# Patient Record
Sex: FEMALE | Race: WHITE | NOT HISPANIC OR LATINO | Employment: FULL TIME | URBAN - METROPOLITAN AREA
[De-identification: names, ages, dates, MRNs, and addresses within clinical notes are randomized per-mention and may not be internally consistent; named-entity substitution may affect disease eponyms.]

---
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
## 2023-02-16 ENCOUNTER — PATIENT MESSAGE (OUTPATIENT)
Dept: NEUROLOGY | Facility: CLINIC | Age: 18
End: 2023-02-16
Payer: COMMERCIAL

## 2023-02-16 RX ORDER — PREDNISONE 20 MG/1
TABLET ORAL
Qty: 12 TABLET | Refills: 0 | Status: SHIPPED | OUTPATIENT
Start: 2023-02-16 | End: 2023-03-01

## 2023-02-16 NOTE — TELEPHONE ENCOUNTER
"Spoke to Mom:  Having more HA since last Friday  No recent illness  Has been saying "my head hurts all over"  NO GIB, no recent use of steroids  Mom mentions she had a near syncopal episode yesterday at school, states d/t pain  Up until a week ago her medication regimen has been working.    I wrote a course of deltasone (on full stomach, breakfast time only), 3 pills for 2 days, 2 pills for 2 days, 1 pill for 2 days then off. I discussed if she can get that into her in the next few hours, that would be ideal (as it may "wire her" and make it difficult to sleep).     If any fevers, feeling faint, would suggest she get in to PCP/UC/ER to r/o sinister process.     If steroids work, but short lived, we can adjust her gabapentin.     All questions answered, mom appreciative of phone call.    TYLER   "

## 2023-02-22 ENCOUNTER — PATIENT MESSAGE (OUTPATIENT)
Dept: NEUROLOGY | Facility: CLINIC | Age: 18
End: 2023-02-22
Payer: COMMERCIAL

## 2023-02-24 ENCOUNTER — TELEPHONE (OUTPATIENT)
Dept: NEUROLOGY | Facility: CLINIC | Age: 18
End: 2023-02-24
Payer: COMMERCIAL

## 2023-02-24 NOTE — TELEPHONE ENCOUNTER
----- Message from Cirilo House PA-C sent at 2/22/2023  3:34 PM CST -----  Regarding: Follow up appt  Can we get her in to see me for a follow up visit (I sent her/mom a note via [employer]Xis I'd like to see her in office).     TYLER

## 2023-03-01 ENCOUNTER — OFFICE VISIT (OUTPATIENT)
Dept: NEUROLOGY | Facility: CLINIC | Age: 18
End: 2023-03-01
Payer: COMMERCIAL

## 2023-03-01 VITALS
DIASTOLIC BLOOD PRESSURE: 73 MMHG | BODY MASS INDEX: 27.08 KG/M2 | HEART RATE: 75 BPM | WEIGHT: 147.19 LBS | SYSTOLIC BLOOD PRESSURE: 107 MMHG | HEIGHT: 62 IN | RESPIRATION RATE: 17 BRPM

## 2023-03-01 DIAGNOSIS — G43.009 MIGRAINE WITHOUT AURA AND WITHOUT STATUS MIGRAINOSUS, NOT INTRACTABLE: Primary | ICD-10-CM

## 2023-03-01 DIAGNOSIS — F41.9 ANXIETY: ICD-10-CM

## 2023-03-01 DIAGNOSIS — G44.209 TENSION HEADACHE: ICD-10-CM

## 2023-03-01 PROCEDURE — 99999 PR PBB SHADOW E&M-EST. PATIENT-LVL IV: CPT | Mod: PBBFAC,,, | Performed by: PHYSICIAN ASSISTANT

## 2023-03-01 PROCEDURE — 99213 PR OFFICE/OUTPT VISIT, EST, LEVL III, 20-29 MIN: ICD-10-PCS | Mod: S$GLB,,, | Performed by: PHYSICIAN ASSISTANT

## 2023-03-01 PROCEDURE — 3078F PR MOST RECENT DIASTOLIC BLOOD PRESSURE < 80 MM HG: ICD-10-PCS | Mod: CPTII,S$GLB,, | Performed by: PHYSICIAN ASSISTANT

## 2023-03-01 PROCEDURE — 1160F RVW MEDS BY RX/DR IN RCRD: CPT | Mod: CPTII,S$GLB,, | Performed by: PHYSICIAN ASSISTANT

## 2023-03-01 PROCEDURE — 99213 OFFICE O/P EST LOW 20 MIN: CPT | Mod: S$GLB,,, | Performed by: PHYSICIAN ASSISTANT

## 2023-03-01 PROCEDURE — 3074F SYST BP LT 130 MM HG: CPT | Mod: CPTII,S$GLB,, | Performed by: PHYSICIAN ASSISTANT

## 2023-03-01 PROCEDURE — 1159F PR MEDICATION LIST DOCUMENTED IN MEDICAL RECORD: ICD-10-PCS | Mod: CPTII,S$GLB,, | Performed by: PHYSICIAN ASSISTANT

## 2023-03-01 PROCEDURE — 1160F PR REVIEW ALL MEDS BY PRESCRIBER/CLIN PHARMACIST DOCUMENTED: ICD-10-PCS | Mod: CPTII,S$GLB,, | Performed by: PHYSICIAN ASSISTANT

## 2023-03-01 PROCEDURE — 3008F PR BODY MASS INDEX (BMI) DOCUMENTED: ICD-10-PCS | Mod: CPTII,S$GLB,, | Performed by: PHYSICIAN ASSISTANT

## 2023-03-01 PROCEDURE — 3074F PR MOST RECENT SYSTOLIC BLOOD PRESSURE < 130 MM HG: ICD-10-PCS | Mod: CPTII,S$GLB,, | Performed by: PHYSICIAN ASSISTANT

## 2023-03-01 PROCEDURE — 3008F BODY MASS INDEX DOCD: CPT | Mod: CPTII,S$GLB,, | Performed by: PHYSICIAN ASSISTANT

## 2023-03-01 PROCEDURE — 1159F MED LIST DOCD IN RCRD: CPT | Mod: CPTII,S$GLB,, | Performed by: PHYSICIAN ASSISTANT

## 2023-03-01 PROCEDURE — 3078F DIAST BP <80 MM HG: CPT | Mod: CPTII,S$GLB,, | Performed by: PHYSICIAN ASSISTANT

## 2023-03-01 PROCEDURE — 99999 PR PBB SHADOW E&M-EST. PATIENT-LVL IV: ICD-10-PCS | Mod: PBBFAC,,, | Performed by: PHYSICIAN ASSISTANT

## 2023-03-01 RX ORDER — PROPRANOLOL HYDROCHLORIDE 10 MG/1
TABLET ORAL
Qty: 60 TABLET | Refills: 5 | Status: SHIPPED | OUTPATIENT
Start: 2023-03-01

## 2023-03-01 NOTE — PROGRESS NOTES
"  [facility]  [address] [facility] [address]  [place] [address]  Phone:[phone]  Fax: [phone]   Follow up visit     Patient Name: Dorys Rich  : 2005  MRN:  [id number]  Today: 3/1/2023   LOV: 2022  chief complaint: Headache      PCP: Tonny Busch MD.    Assessment:   Dorys Rich is a 17 y.o. right handed female with a PMHx of: anxiety/depression, ADHD, ASD with speech/cognitive changes (?), and family hx of IIH  whom presents with her sister  in follow up for HA. KELSEY chronic, have been better with gabapentin, but recently more frequent (only when she is in school, states she is having stress from being there at times). HA appearing to be more tension quality when at school.            Review:    ICD-10-CM ICD-9-CM   1. Migraine without aura and without status migrainosus, not intractable  G43.009 346.10   2. Anxiety  F41.9 300.00   3. Tension headache  G44.209 307.81               Plan:        For HA Prevention:    Gabapentin  q2h before bed at 200 mg   PRN inderal ordered at 10 mg (use for HA or "stage fright"/anxiety)., discussed adv effects/dosing, can use up to 2 x in a day. If any hypotension or bradycardia, stop.     For HA :   maxalt      To break up Headaches:  Can consider nerve blocks in future       Other:  Follow up with immunology /rheum/other specialists              All test results as well as any necessary instructions were reviewed and discussed with patient.    Review:  Orders Placed This Encounter    propranoloL (INDERAL) 10 MG tablet               Patient to return to PCP/other specialists for all other problems  Patient to continue on all medications as Rx'd  Full office note available online   RTO- 3-4 months, family will use portal to schedule follow up   The patient indicates understanding of these issues and agrees to the plan.    HPI:   Dorys Rich is a 17 y.o.right handed, female with " "a PMHx of: anxiety/depression, ADHD, ASD with speech/cognitive changes (?), and family hx of IIH  whom presents with her sister in follow up for KELSEY       At last visit: gabapentin adjusted to 200 mg QHS, and maxalt refilled. In mid Feb 2023 her mother wrote me a portal message about a string of HA. I wrote a prednisone taper to try and break the HA.      Steroids gave relief for a short time then KELSEY came right back   Gabapentin at 200 mg does make her tired but has helped ultimately  Pain is holocranial   No reports of N/V  Normally maxalt takes the HA pain venegas away   Feels stress may be triggering her HA         -seems to only occur when she is in school         -notices with stress, palpitations occur more          -"I don't get HA on weekend or when I am home."      No HA right now, "I wasn't in school today"   Confirmed she has no Hx of asthma.     From previous visits:: zanaflex stopped, gabapentin started, and maxalt available for bad HA. MRI brain and MRA head were both normal (9/21/2022).   HA still daily, but only for a few hours and less intense.  "She has felt the best on the gabapentin"   Gabapentin makes her a bit tired, goes right to bed   Still working with multiple providers for concerns about possible lupus and will be seeing dysautonomia specialist.   No other concerns, only mild HA in office   HA can be "all over"     From previous visits   Mother provides much of Hx.   At last visit: tapered off zonegran, started zanaflex (not interested in gabapentin), and has maxalt.   Saw immunologist, possible lupus (?) and rheumatologist ---"we can't figure out whats going on, she has more schedule appts coming up"   Has some polyps in her sinuses, will be getting allergy treatments in near future  Getting HA daily ---all over whole head   Pain seems to occur by mid day and seems to last until sleep  Muscle relaxant helps reduce severity but not stop the HA., mother mentions "if she misses the dose, she " "awakens and feels off and has a HA"   Sleeping well   Maxalt does work   No new weakness or vision changes  Per mom: "she just seems worse"        From last visit: started on topamax, then switched to zonegran d/t appetite suppression. Still having HA daily.   Mother provides most of Hx, patient supplements  At first states daily HA, comes/goes along frontalis, then mentions, HA occurring in back of neck, radiating forward and to the back of the eyes  Mother states "its hard to know when she has a HA and specifics as she doesn't tell me."  Seems despite switching to zonegran, still having KELSEY, Chelsey mentions "Food taste funny" and she is losing weight.   Mother also mentions she is having trouble sleeping, when historically "she was a good sleeper."  Has seen immunology, per mother, is doing "further work up."   HA 3-5/10 pain  Maxalt is helpful, if HA caught early  Chelsey states she had HA this morning and seems to have gone away 5-6 mins before the appt  No trigger identified  No new weakness/no new sensory changes  No falls.       From previous visit:     Patient provides most of her own hx, mother supplements at times     HA HPI:  Start:HA for a little while, unclear when they officially started (historically 1 HD a week, mother mentions "she may have more, but she never tells me if she has a HA until its too late, her siblings both have HA/IIH and one has abdominal migraine--all treated with elavil), however most noticeable on Good Friday with a concurrent fever and nausea (hence the hospitalization as above)   History of trauma (no), History of CNS infection (no), History of Stroke (no)  Location:forehead  However could radiate to the whole   Severity: range: 2-8/10  Duration:lasting all day long   Frequency:daily HA for the past couple weeks, historically "maybe" 1 HD a week (4HD/30 HD in a month)    Associated factors (bolded positive) WITH HA ( or migraine): Nausea, vomiting, photophobia, phonophobia, " "blurry vision (like dots/pixelation) tinnitus, scalp pain, vision loss, diplopia, scintillations, eye pain, jaw pain, weakness?    Tried:fioricet, OTC   Triggers (in bold): stress, lack of sleep, too much caffeine, too little caffeine, weather change, seasonal change, strong odours, bright lights, sunlight, food, mother states she has immune deficiency and is pending appt with immunologist in near future (recently had mono a few months ago).      Currently having a HA?:yes, 3/10  Positives in bold: Hx of Kidney Stones, asthma, GI bleed, osteoporosis, CAD/MI, CVA/TIA, DM  <---denies  Imaging on file: CT head 2022-NML, MRI and MRA head 9/21/2022 ---both normal   Therapies tried in past: (failures to be marked, if known---why did it fail?)  wellbutrin  fioriciet  catapress  zoloft  Flexeril  toradol  topamax  zonegran   zanaflex    maxalt   Zonegran  Zanaflex  Prednisone -short lived relief              From Dr. Cedillo's assessment/plan (4/30/2022):  "  * Headache  Nonfocal headache, in context of febrile illness  - do not suspect viral / aseptic meningitis given clinical exam and CSF findings  - do not recommend antimicrobial coverage for meningitis  - possible migrainous headache, CSF pressure related (improved following LP, family hx IIH) or secondary to other systemic process related to her fevers     For now would continue PRN toradol, advised she take one now to stay ahead of pain. Remain well hydrated, maintain bedrest until this evening. Will follow.  Call if any return of headache      Fever of unknown origin  Workup in progress per primary service  - duration approx 2 weeks  - normal CBC, BMP, LFTs and bili, UA  - negative respiratory viral panel and blood cultures to date  - CSF benign; no indication of meningitis, viral or otherwise     con't workup of FUO to assess for infection, malignancy, rheumatological illness per primary team and ID   ":    Medication Reconciliation:   Current Outpatient " Medications   Medication Sig Dispense Refill    buPROPion (WELLBUTRIN XL) 150 MG TB24 tablet Take 150 mg by mouth every morning.      cloNIDine (CATAPRES) 0.1 MG tablet Take 0.1 mg by mouth 2 (two) times daily as needed.      gabapentin (NEURONTIN) 100 MG capsule 2 pills 2 hours before bed every night 60 capsule 6    hydrOXYzine pamoate (VISTARIL) 25 MG Cap 1 pill BID for 2 days, rest left over q8h PRN headache, no driving/causes sedatrion 15 capsule 0    levocetirizine (XYZAL) 5 MG tablet Take 5 mg by mouth every evening.      methylphenidate HCl 54 MG CR tablet Take 54 mg by mouth every morning.      pantoprazole (PROTONIX) 40 MG tablet Take 40 mg by mouth 2 (two) times daily.      predniSONE (DELTASONE) 20 MG tablet On full stomach (breakfast): 3 tabs for 2 days, 2 tabs for 2 days, 1 tab for 2 days. Finish 12 tablet 0    pseudoephedrine-guaiFENesin  mg (MUCINEX D)  mg per tablet Take 1 tablet by mouth every 12 (twelve) hours.      rizatriptan (MAXALT-MLT) 10 MG disintegrating tablet 1 melt at onset headache, second melt 2 hours later if needed, no more than 2 melts day/ 3days use in week 12 tablet 4    sertraline (ZOLOFT) 100 MG tablet Take 100 mg by mouth once daily.      vitamin D (VITAMIN D3) 1000 units Tab Take 1,000 Units by mouth once daily.       No current facility-administered medications for this visit.     Review of patient's allergies indicates:   Allergen Reactions    Midazolam      Other reaction(s): aggression    Nuts [tree nut]     Reglan [metoclopramide]     Shellfish containing products        PMHx:  Past Medical History:   Diagnosis Date    Anxiety     Attention deficit     Autism disorder     expressive language and cognitive desorder , like a 8 yo;     Depression     Headache      Past Surgical History:   Procedure Laterality Date    ADENOIDECTOMY      CHOLECYSTECTOMY      LAPAROSCOPIC CHOLECYSTECTOMY N/A 12/27/2021    Procedure: CHOLECYSTECTOMY-LAPAROSCOPIC;  Surgeon: Eb METCALF  MD Pancho;  Location: [facility] OR;  Service: General;  Laterality: N/A;    MYRINGOTOMY W/ TUBES      TONSILLECTOMY         Fhx:  Family History   Problem Relation Age of Onset    Depression Mother     Anxiety disorder Mother     Psoriasis Mother         psoriatic arthritis    Depression Father     Anxiety disorder Father     Juvenile idiopathic arthritis Father     Depression Sister     Anxiety disorder Sister     Hypertension Sister     Migraines Sister     Hypertension Brother     Psoriasis Son     Osteoarthritis Neg Hx     Inflammatory bowel disease Neg Hx        Shx:   Social History     Socioeconomic History    Marital status: Single   Tobacco Use    Smoking status: Never    Smokeless tobacco: Never   Substance and Sexual Activity    Alcohol use: Not Currently    Drug use: Not Currently    Sexual activity: Not Currently           Labs:   Lab Results   Component Value Date    WBC 7.24 06/30/2022    HGB 13.3 06/30/2022    HCT 38.5 06/30/2022    MCV 84 06/30/2022     06/30/2022     CMP  Sodium   Date Value Ref Range Status   09/17/2022 142 136 - 145 mmol/L Final     Potassium   Date Value Ref Range Status   09/17/2022 4.6 3.5 - 5.1 mmol/L Final     Chloride   Date Value Ref Range Status   09/17/2022 102 95 - 110 mmol/L Final     CO2   Date Value Ref Range Status   09/17/2022 29 22 - 31 mmol/L Final     Glucose   Date Value Ref Range Status   09/17/2022 92 70 - 110 mg/dL Final     Comment:     The ADA recommends the following guidelines for fasting glucose:    Normal:       less than 100 mg/dL    Prediabetes:  100 mg/dL to 125 mg/dL    Diabetes:     126 mg/dL or higher       BUN   Date Value Ref Range Status   09/17/2022 7 7 - 18 mg/dL Final     Creatinine   Date Value Ref Range Status   09/17/2022 0.66 0.50 - 1.40 mg/dL Final     Calcium   Date Value Ref Range Status   09/17/2022 10.1 8.4 - 10.2 mg/dL Final     Total Protein   Date Value Ref Range Status   09/17/2022 7.7 6.0 - 8.4 g/dL Final     Albumin    Date Value Ref Range Status   09/17/2022 5.1 (H) 3.2 - 4.7 g/dL Final     Total Bilirubin   Date Value Ref Range Status   09/17/2022 0.4 0.2 - 1.3 mg/dL Final     Alkaline Phosphatase   Date Value Ref Range Status   09/17/2022 66 38 - 145 U/L Final     AST   Date Value Ref Range Status   09/17/2022 24 14 - 36 U/L Final     ALT   Date Value Ref Range Status   09/17/2022 17 0 - 35 U/L Final     Anion Gap   Date Value Ref Range Status   09/17/2022 11 8 - 16 mmol/L Final     eGFR if    Date Value Ref Range Status   06/30/2022 >60 >60 mL/min/1.73 m^2 Final     eGFR if non    Date Value Ref Range Status   06/30/2022 >60 >60 mL/min/1.73 m^2 Final     Comment:     Calculation used to obtain the estimated glomerular filtration  rate (eGFR) is the CKD-EPI equation.           Latest Reference Range & Units 12/15/21 18:23 12/15/21 20:38 12/24/21 10:18 04/25/22 19:47 04/29/22 15:52 04/30/22 12:40   Influenza A, Molecular Negative  Negative   Negative     Influenza B, Molecular Negative  Negative   Negative     RSV Ag by Molecular Method Negative     Negative     Monospot Negative       Negative   Adeno Test Not Detected   Not Detected   Not Detected    Chlamydia pneumoniae Not Detected   Not Detected [1]   Not Detected [2]    Coronavirus (229E, HKU1, NL64, OC43), Common Cold Virus Not Detected   Not Detected [3]   Not Detected [4]    Human Metapneumovirus Not Detected   Not Detected   Not Detected    Human Rhinovirus/Enterovirus Not Detected   Not Detected [5]   Not Detected [6]    Influenza A - U3O3-89 Not Detected   Not Detected   Not Detected    Influenza B Not Detected   Not Detected   Not Detected    Mycoplasma pneumoniae Not Detected   Not Detected [7]   Not Detected [8]    Parainfluenza Virus 1 Not Detected   Not Detected   Not Detected    Parainfluenza Virus 2 Not Detected   Not Detected   Not Detected    Parainfluenza Virus 3 Not Detected   Not Detected   Not Detected    Parainfluenza  Virus 4 Not Detected   Not Detected   Not Detected    Respiratory Infection Panel Source   NP swab   NP swab    Respiratory Syncytial VirusVirus (RSV) A Not Detected   Not Detected   Not Detected    RVP - Influenza A Not Detected   Not Detected   Not Detected    RVP - Influenza A, subtype H1 Not Detected   Not Detected   Not Detected    RVP - Influenza A, subtype H3 Not Detected   Not Detected [9]   Not Detected [10]    RVP - Respiratory Synctial Virus (RSV) B Not Detected   Not Detected   Not Detected    SARS-CoV2 (COVID-19) Qualitative PCR Not Detected  Negative [11] Not Detected Not Detected [12] Negative [13] Not Detected    [1] Due to the small number of positive samples collected for    Chlamydia pneumoniae during the prospective and retrospective   clinical studies, performance characteristics for Chlamydia   pneumoniae were established primarily with contrived clinical   specimens. Performance characteristics for Influenza A H1 were    established using contrived clinical specimens only.      [2] Due to the small number of positive samples collected for    Chlamydia pneumoniae during the prospective and retrospective   clinical studies, performance characteristics for Chlamydia   pneumoniae were established primarily with contrived clinical   specimens. Performance characteristics for Influenza A H1 were    established using contrived clinical specimens only.      [3] Clinical evaluation indicates a lower sensitivity for the   detection of coronavirus OC43. If infection with coronavirus   OC43 is suspected, negative samples should be confirmed using   an alternative method.      At high titers of SARS-CoV-2, cross-reactivity with SARS-CoV-1 was    observed with the [facility] RP2 panel.     [4] Clinical evaluation indicates a lower sensitivity for the   detection of coronavirus OC43. If infection with coronavirus   OC43 is suspected, negative samples should be confirmed using   an alternative method.      At high  titers of SARS-CoV-2, cross-reactivity with SARS-CoV-1 was    observed with the [employer] RP2 panel.     [5] Due to the genetic similarity between human rhinovirus/enterovirus   and poliovirus, the ePlex RP2 Panel cannot differentiate them.   If a poliovirus infection is suspected, an ePlex RP2 human   rhinovirus/enterovirus result of Detected should be confirmed   using an alternate method (e.g. cell culture).      Due to the genetic similarity between human rhinovirus   and enterovirus, this test cannot reliably differentiate   them. An ePlex RP2 Panel Rhinovirus/Enterovirus positive   result should be followed up using an alternate method   (e.g. cell culture or sequence analysis) if differentiation   between the viruses is required.     [6] Due to the genetic similarity between human rhinovirus/enterovirus   and poliovirus, the ePlex RP2 Panel cannot differentiate them.   If a poliovirus infection is suspected, an ePlex RP2 human   rhinovirus/enterovirus result of Detected should be confirmed   using an alternate method (e.g. cell culture).      Due to the genetic similarity between human rhinovirus   and enterovirus, this test cannot reliably differentiate   them. An ePlex RP2 Panel Rhinovirus/Enterovirus positive   result should be followed up using an alternate method   (e.g. cell culture or sequence analysis) if differentiation   between the viruses is required.     [7] The Respiratory Infection Panel is performed on the    [employer] ePlex instrument which automates all aspects of    nucleic acid testing including magnetic solid phase extraction,   reverse transcription, amplification, and detection.    eSensor technology is based on the principles of competitive    DNA hybridization and electrochemical detection, which is highly   specific and is not based on fluorescent or optical detection.      If four or more organisms are detected in a sample, retesting   is recommended to confirm polymicrobial result.        There is a risk of false positive or false negative values   resulting from improperly collected, transported, or handled   samples.      A result of No Targets Detected on the ePlex RP2 Panel should not   be used as the sole basis for diagnosis, treatment or other   patient management decisions.      The performance of this test has not been established for patients   without signs and symptoms of respiratory infection.       This test is only for use under the Food and Drug Administration's    Emergency Use Authorization (EUA). The SARS-CoV-2 assay on the    RP2 panel has been validated but FDA's independent review    of the validation is pending.     [8] The Respiratory Infection Panel is performed on the    [employer] ePlex instrument which automates all aspects of    nucleic acid testing including magnetic solid phase extraction,   reverse transcription, amplification, and detection.    eSensor technology is based on the principles of competitive    DNA hybridization and electrochemical detection, which is highly   specific and is not based on fluorescent or optical detection.      If four or more organisms are detected in a sample, retesting   is recommended to confirm polymicrobial result.       There is a risk of false positive or false negative values   resulting from improperly collected, transported, or handled   samples.      A result of No Targets Detected on the ePlex RP2 Panel should not   be used as the sole basis for diagnosis, treatment or other   patient management decisions.      The performance of this test has not been established for patients   without signs and symptoms of respiratory infection.       This test is only for use under the Food and Drug Administration's    Emergency Use Authorization (EUA). The SARS-CoV-2 assay on the    RP2 panel has been validated but FDA's independent review    of the validation is pending.     [9] The ePlex RP2 Panel canot differentiate variant viruses,     such as H3N2v, from seasonal influenza A viruses.    If variant virus infection is suspected, clinicians   should contact their state or local health department   to arrange specimen transport and request a timely   diagnosis at a state public health laboratory.     [10] The ePlex RP2 Panel canot differentiate variant viruses,    such as H3N2v, from seasonal influenza A viruses.    If variant virus infection is suspected, clinicians   should contact their state or [facility] health department   to arrange specimen transport and request a timely   diagnosis at a state public health laboratory.     [11] This test utilizes a real-time RT-PCR test intended for   the simultaneous qualitative detection and differentiation   of SARS-CoV-2, influenza A, influenza B, and respiratory   syncytial virus (RSV) viral RNA. The analytical sensitivity   (limit of detection) of the SARS-CoV-2 assay is 131 copies/mL.   Negative results do not rule out the possibility of SARS-CoV-2,   influenza A virus, influenza B virus and/or RSV infection    and should not be used as a sole basis for treatment or other   patient management decisions.      This test is only for use under the Food and Drug Administration's   Emergency Use Authorization (EUA). The [employer] Xpert Xpress   SARS-CoV-2/FLU/RSV Letter of Authorization, along with the    authorized Fact Sheet for Healthcare Providers, the authorized   Fact Sheet for Patients and authorized labeling are available   on the FDA website:      http://www.fda.gov/medical-devices/coronavirus-disease-2019-   covid-19-emergency-useauthorizations-medical-devices/vitro-   diagnostics-euas.     [12] This test utilizes real-time fluorescent reverse    transcription isothermal amplification to detect RNA    from the N and ORF-1ab genes of the SARS-CoV-2 virus.   The analytical sensitivity (limit of detection) of    this assay is 10 copies/uL.      A POSITIVE result is indicative of the presence of     SARS-CoV-2 RNA.  Clinical correlation with patient    history and other diagnostic information is necessary   to determine patient infection status.        A NOT DETECTED result does not preclude SARS-CoV-2    infection and should not be used as the sole basis for   patient management decisions.  If COVID-19 is strongly   suspected based on clinical and epidemiological history,   re-testing using an alternate molecular assay should   be considered.      This test is only for use under the Food and Drug   Administration s Emergency Use Authorization (EUA).     Commercial reagents are provided by [employer].   Performance characteristics of the EUA have been    independently verified by [facility]   Department of Pathology and Laboratory Medicine.   _______________________________________________________     [13] This test utilizes a real-time RT-PCR test intended for   the simultaneous qualitative detection and differentiation   of SARS-CoV-2, influenza A, influenza B, and respiratory   syncytial virus (RSV) viral RNA. The analytical sensitivity   (limit of detection) of the SARS-CoV-2 assay is 131 copies/mL.   Negative results do not rule out the possibility of SARS-CoV-2,   influenza A virus, influenza B virus and/or RSV infection    and should not be used as a sole basis for treatment or other   patient management decisions.      This test is only for use under the Food and Drug Administration's   Emergency Use Authorization (EUA). The "[employer] Xpert Xpress   SARS-CoV-2/FLU/RSV Letter of Authorization, along with the    authorized Fact Sheet for Healthcare Providers, the authorized   Fact Sheet for Patients and authorized labeling are available   on the FDA website:      http://www.fda.gov/medical-devices/coronavirus-disease-2019-   covid-19-emergency-useauthorizations-medical-devices/vitro-   diagnostics-euas.        Latest Reference Range & Units 12/15/21 18:23 04/29/22 15:52   Specimen UA   Urine, Clean Catch Urine, Clean Catch   Color, UA Yellow, Straw, Margaret  Yellow Yellow   Appearance, UA Clear  Clear Clear   Specific Gravity, UA 1.005 - 1.030  1.003 1.010   PH, UA 5.0 - 8.0  6.5 7.0   Protein, UA Negative  Negative [1] Negative [2]   Glucose, UA Negative  Negative Negative   Ketones, UA Negative  Negative Negative   Occult Blood UA Negative  Trace ! Negative   NITRITE UA Negative  Negative Negative   UROBILINOGEN UA <2.0 EU/dL 0.2 0.2   Bilirubin (UA) Negative  Negative Negative   Leukocytes, UA Negative  2+ ! 1+ !   RBC, UA 0 - 4 /hpf  0 - 4 /hpf 1  1 1  1   WBC, UA 0 - 5 /hpf  0 - 5 /hpf 20 (H)  20 (H) 9 (H)  9 (H)   Bacteria, UA Negative /hpf  Negative /hpf Few !  Few ! Negative  Negative   Squam Epithel, UA /hpf 5  5 3  3   Hyaline Casts, UA 0 - 1 /lpf  0 - 1 /lpf 0  0 2 !  2 !   Yeast, UA None  Occasional !    Microscopic Comment  SEE COMMENT [3] SEE COMMENT [4]   !: Data is abnormal  (H): Data is abnormally high  [1] Recommend a 24 hour urine protein or a urine    protein/creatinine ratio if globulin induced proteinuria is   clinically suspected.     [2] Recommend a 24 hour urine protein or a urine    protein/creatinine ratio if globulin induced proteinuria is   clinically suspected.     [3] Other formed elements not mentioned in the report are not    present in the microscopic examination.      [4] Other formed elements not mentioned in the report are not    present in the microscopic examination. .     Latest Reference Range & Units 04/30/22 10:35   COLOR CSF Colorless  Colorless   Heme Aliquot mL 1.0   Appearance, CSF Clear  Clear   RBC, CSF 0 /cu mm 9 !   WBC, CSF 0 - 5 /cu mm 0   Glucose, CSF 40 - 70 mg/dL 55 [1]   Protein, CSF 12 - 60 mg/dL 33 [2]   Enterovirus Spec Source  CSF   Enterovirus DNA Probe  Not Detected [3]   !: Data is abnormal  [1] Infants: 60 to 80 mg/dL  [2] Infants can have higher CSF protein results due to increased   permeability of the blood-brain barrier.     [3]  NOT DETECTED - A negative result does not rule out the    presence of PCR inhibitors in the patient specimen or    assay specific nucleic acid in concentrations below the    level of detection by the assay.   INTERPRETIVE INFORMATION: Enterovirus by PCR   This test was developed and its performance characteristics    determined by [facility]. It has not been cleared or    approved by the US Food and Drug Administration. This test    was performed in a CLIA certified laboratory and is    intended for clinical purposes.   Performed by [facility],                               [address] [place] [address] [phone]                     [email or web address] Elsie Arboleda MD - Lab. Director     4/29/2022 blood culture: negative  4/30/2022 CSF culture, negative        Latest Reference Range & Units 06/30/22 14:53 07/14/22 10:50   Sed Rate 0 - 36 mm/Hr  4   PTT Lupus Anticoagulant 32 - 48 sec  37   PTT-LA Mix 32 - 48 sec  Not Applicable   APA Isotype IgG 0.00 - 14.99 GPL  <9.40   APA Isotype IgM 0.00 - 12.49 MPL  12.30   Beta-2 Glyco 1 IgA <=20 ANNE  <9   Beta-2 Glyco 1 IgG <=20 SGU  <9   Beta-2 Glyco 1 IgM <=20 SMU  <9   dRVVT Confirm Negative ratio  Not Applicable   dRVVT Incubated 1:1 Mix 33 - 44 sec  Not Applicable   DRVVT Screen 33 - 44 sec  31 (L)   Hex Phosph Neut Test Negative   Not Applicable   Reptilase Time <=21.9 sec  Not Applicable   Thrombin Time 14.7 - 19.5 sec  Not Applicable   PLATELET NEUTRALIZATION APTT Negative   Not Applicable   Lupus Anticoagulant Interpretation   See Note   PT Lupus Anticoagulant 12.0 - 15.5 sec  13.2   PTT Heparin Neutralized 32 - 48 sec  Not Applicable   Sodium 136 - 145 mmol/L 141    Potassium 3.5 - 5.1 mmol/L 4.0    Chloride 95 - 110 mmol/L 107    CO2 22 - 31 mmol/L 24    Anion Gap 8 - 16 mmol/L 10    BUN 7 - 18 mg/dL 8    Creatinine 0.50 - 1.40 mg/dL 0.62    eGFR if non African American >60 mL/min/1.73 m^2 >60    eGFR if African American >60 mL/min/1.73 m^2 >60     Glucose 70 - 110 mg/dL 116 (H)    Calcium 8.4 - 10.2 mg/dL 9.9    Magnesium 1.6 - 2.6 mg/dL 1.8    Alkaline Phosphatase 38 - 145 U/L 66    PROTEIN TOTAL 6.0 - 8.4 g/dL 7.7    Albumin 3.2 - 4.7 g/dL 4.9 (H)    BILIRUBIN TOTAL 0.2 - 1.3 mg/dL 0.3    AST 14 - 36 U/L 27    ALT 0 - 35 U/L 15    CRP 0.0 - 8.2 mg/L  <0.3   CPK 20 - 180 U/L  52   Zinc, Serum-ALT 60.0 - 120.0 ug/dL 85.9    Thyroglobulin, LC/MS/MS 1.3 - 31.8 ng/mL Not Applicable    TSH 0.400 - 4.000 uIU/mL 0.566    Thyroglobulin 1.3 - 31.8 ng/mL 10.7    Thyroperoxidase Antibodies 0.0 - 9.0 IU/mL 1.9    Thyroglobulin Ab Screen 0.0 - 4.0 IU/mL <0.9    ALLERGEN GLUTEN IGE <=0.34 kU/L <0.10    Allergen Maple ([place]) IgE <=0.34 kU/L <0.10    Alternaria alternata <=0.34 kU/L <0.10    Aspergillus fumigatus <=0.34 kU/L <0.10    BERMUDA GRASS <=0.34 kU/L <0.10    Birch (T3) IgE <=0.34 kU/L <0.10    Cat Dander <=0.34 kU/L <0.10    Cedar IgE <=0.34 kU/L <0.10    Cladosporium Herbarum (M2) IgE <=0.34 kU/L <0.10    Clams <=0.34 kU/L <0.10    Cockroach, IgE <=0.34 kU/L <0.10    Codfish IgE <=0.34 kU/L <0.10    Common Pigweed IgE <=0.34 kU/L <0.10    Corn <=0.34 kU/L <0.10    [place] IgE <=0.34 kU/L <0.10    D. farinae <=0.34 kU/L <0.10    Dog Dander, IgE <=0.34 kU/L <0.10    Egg White <=0.34 kU/L <0.10    Milk IgE <=0.34 kU/L <0.10    Mite Dust Pteronyssinus IgE <=0.34 kU/L <0.10    Allergen Peanut IgE <=0.34 kU/L <0.10    Pecan Deuel Tree <=0.34 kU/L <0.10    Penicillium, IgE <=0.34 kU/L <0.10    Ragweed, Short, IgE <=0.34 kU/L <0.10    RAST Allergen Interpretation  See Note    Rough Marshelder <=0.34 kU/L <0.10    SCALLOP IGE <=0.34 kU/L <0.10    Sesame Seed, IgE <=0.34 kU/L <0.10    SHRIMP IGE <=0.34 kU/L <0.10    Soybean IgE <=0.34 kU/L <0.10    [id number] El, American (White <=0.34 kU/L <0.10    [place] Grass <=0.34 kU/L <0.10    WALNUT <=0.34 kU/L <0.10    Lewis Tree, IgE <=0.34 kU/L <0.10    Wheat IgE <=0.34 kU/L <0.10    WHITE HANG TREE <=0.34 kU/L <0.10     White [place] (T70) IgE <=0.34 kU/L <0.10    White Oak(Quercus alba) IgE <=0.34 kU/L <0.10    ds DNA Ab Negative 1:10  10 Negative 1:10   EDISON IgG by IFA <1:80  Detected (H)    SSA 60 (Ro) PAU Antibody 0 - 40 AU/mL 1    Sarah (PAU) Ab, IgG 0 - 40 AU/mL 1    EDISON Pattern  Speckled !    EDISON Titer  1:80 !    EDISON Interpretive Comment  See Note    Celiac Anit-Human Tissue Transglutaminase Iga 0 - 3 U/mL <2    Chromatin Nucleosomal Antibody 0 - 19 Units 24 (H)    SCL-70 Antibody 0 - 40 AU/mL 2    SM/RNP Antibody 0 - 19 Units 4    SSA Antibody 0 - 40 AU/mL 5    SSB Antibody 0 - 40 AU/mL 0    Complement (C-3) 50 - 180 mg/dL  107   Complement (C-4) 11 - 44 mg/dL  11   IgG 650 - 1600 mg/dL 896    IgM 50 - 300 mg/dL 267    IgA 60 - 349 mg/dL  40 - 350 mg/dL 102  106    IgE 0 - 100 IU/mL <35    IgG 1 325 - 894 mg/dL 648    IgG 2 156 - 625 mg/dL 37 (L)    IgG 3 34 - 246 mg/dL 51    IgG 4 2 - 170 mg/dL 3    CCP Antibodies <5.0 U/mL  <0.5   Rheumatoid Factor 0.0 - 15.0 IU/mL  <13.0   Absolute CD19 91 - 610 cells/uL 208    Absolute CD3 570 - 2400 cells/uL 1358    Absolute CD4 430 - 1800 cells/uL 821    Absolute CD8 210 - 1200 cells/uL 435    Absolute Natural Killer Cells 78 - 470 cells/uL 151    CD19 B Cells 6 - 23 % 12    CD3 % Total T Cell 62 - 87 % 78    CD4 % Cut Off T Cell 32 - 64 % 47    CD4/CD8 Ratio 0.80 - 3.90 ratio 1.88    CD8 % Suppressor T Cell 15 - 46 % 25    Lymphocyte Subset Pnl 5 Information  See Note    NATURAL KILLER CELLS 4 - 26 % 9    Direct Yelena (JANINE)   NEG   Pneumococcal Serotype 1 IgG (P13,PNX) ug/mL 0.15    Pneumococcal Serotype 12F IgG (PNX) ug/mL 0.46    Pneumococcal Serotype 14 IgG (P7,P13,PNX) ug/mL 6.09    Pneumococcal Serotype 18C IgG (P7,P13,PNX) ug/mL 10.55    Pneumococcal Serotype 19F IgG (P7,P13,PNX) ug/mL 9.83    Pneumococcal Serotype 23F IgG (P7,P13,PNX) ug/mL 1.74    Pneumococcal Serotype 3 IgG  (P13,PNX) ug/mL 1.43    Pneumococcal Serotype 4 IgG  (P7,P13,PNX) ug/mL 3.76    Pneumococcal  Serotype 5 IgG (P13,PNX) ug/mL 1.14    Pneumococcal Serotype 6B IgG (P7,P13,PNX) ug/mL 7.20    Pneumococcal Serotype 7F IgG (P13,PNX) ug/mL 2.43    Pneumococcal Serotype 8 IgG (PNX) ug/mL 1.38    Pneumococcal Serotype 9N IgG (PNX) ug/mL 0.85    Pneumococcal Serotype 9V IgG (P7,P13,PNX) ug/mL 2.10    (L): Data is abnormally low  (H): Data is abnormally high  !: Data is abnormal      --------imaging studies------------    9/21/2022 MRI brain (report): There is no acute intracranial abnormality.  Considering differences in modality, there is no change in the appearance of the brain compared to the prior study.  Brain volume, ventricular size and position are normal.  There is no hemorrhage or mass/mass effect.  There are no regions of abnormal signal intensity in the brain.  There are no regions of restricted diffusion to suggest acute infarction.  There is no pathologic enhancement.  The basilar cisterns are open.  There is no abnormal extra-axial fluid collection.  Flow voids indicating patency are present in the major vessels at the base of the brain.  The cerebellar tonsils are just at the level of the foramen magnum in normal position.  The sellar structures are normal.  The orbits are grossly normal.     Skull/extracranial contents: Marrow signal intensity in the clivus and calvarium is grossly normal.  There is trace mucosal thickening in the ethmoid air cells.  There is a tiny mucous retention cyst along the lateral wall of the left maxillary sinus.  Otherwise, the paranasal sinuses and mastoid air cells are clear.  The included facial soft tissues and scalp are normal.     Impression:     1.  Normal imaging of the brain.  There is no acute abnormality.  There is no hemorrhage, mass/mass effect, acute infarction.  There is no pathologic enhancement.    9/21/2022 MRA Brain (report): FINDINGS:  Anterior circulation: There is normal flow related signal intensity within the petrous and cavernous segments of the  internal carotid arteries.  The supraclinoid internal carotid arteries are normal as is the carotid terminus bilaterally.  There is normal branching into the anterior and middle cerebral arteries.  These vessels are patent.  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  There is no critical stenosis, occlusion, thrombosis, dissection, aneurysm or other vascular malformation.     Posterior circulation: The vertebral arteries are patent.  The basilar artery is normal in caliber and contour.  The basilar tip is normal.  There is normal branching into the superior cerebellar and posterior cerebral arteries.  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  There is no critical stenosis, occlusion, thrombosis, dissection, aneurysm.     Impression:     1. Normal brain MRA.      4/25/2022 CT head (Report): FINDINGS:  Gray-white matter differentiation is within normal limits.   No acute intracranial hemorrhage, extra-axial fluid collection, hydrocephalus, mass effect, midline shift is noted.  No large vessel territory acute ischemia is identified.  Visualized paranasal sinuses demonstrate minimal scattered mucoperiosteal thickening visualized mastoid air cells are clear.  No acute displaced calvarial fracture is identified.     Impression:     1. No acute intracranial abnormalities identified.            Other testing:  Reviewed in chart     Note: I have independently reviewed any/all imaging/labs/tests and agree with the report (s) as documented.  Any discrepancies will be as noted/demarcated by free text.  TYLER GRIJALVA 3/1/2023                     ROS:   Review Of Systems (questions asked, positive or additions in BOLD)  Gen: Weight change, fatigue/malaise, pyrexia   HEENT: Tinnitus, headache,  blurred vision, eye pain, diplopia, photophobia,  nose bleeds, congestion, sore throat, jaw pain, scalp pain, neck stiffness   Card: Palpitations, CP   Pulm: SOB, cough   Vas: Easy bruising, easy  "bleeding   GI: N/V/D/C, incontinence, hematemesis, hematochezia    : incontinence, hematuria   Endocrine: Temp intolerance, polyuria, polydipsia   M/S: Neck pain, myalgia, back pain, joint pain, falls    Neuro: PER HPI   PSY: Memory loss, confusion, depression, anxiety, trouble in sleep          EXAM:   /73 (BP Location: Left arm, Patient Position: Sitting, BP Method: Medium (Automatic))   Pulse 75   Resp 17   Ht 5' 2" (1.575 m)   Wt 66.7 kg (147 lb 2.5 oz)   BMI 26.92 kg/m²      GEN:  NAD  HEENT: NC/AT, nares patent, dentition appropriate, vertex/frontalis/temporalis/trapezius NTTP        NEURO:  Mental Status:  Awake, alert and appropriately oriented to time, place, and person.  Decreased attention and concentration at today's visit.  Speech is fluent and appropriate language function (I.e., comprehension), eye contact throughout visit WNL       Cranial Nerves:   Extraoccular  movements are intact and without nystagmus.  Visual fields are full to confrontation testing.    Facial movement is symmetric.  Hearing is normal. Uvula in midline. FROM of neck in all (6) directions, shoulder shrug symmetrical. Tongue in midline without fasiculation.                Motor: antigravity in all limbs, bilat UE    5/5 bilat UE/LE   No drift  No resting tremor         Gait and Stance: Normal manner of stance and gait function testing.            This document has been electronically signed by Mr. Cirilo House MPA, PA-C on 3/1/2023, I have personally typed this message using the EMR.       Dr Tawanda MD was available during today's visit.      Personal Protective Equipment:    Personal Protective Equipment was used during this encounter including;    mask-surgical, and non latex gloves.       CC: Tonny Busch MD              "

## 2023-03-01 NOTE — LETTER
2023    Dorys Rich  [address] [address]             [place] [facility]  [address] [facility] [address] [address]  Phone: [phone]  Fax: [phone] To who it may concern,     The above named patient (: 2005) was rx'd inderal today for headaches and anxiety. I pill up to twice a day as needed. Please allow her to take her necessary medicine. If any concerns, feel free to contact my office.     Thank you for your due diligence in these matters.    Regards,        Cirilo House MPA, PA-C

## 2023-03-02 ENCOUNTER — TELEPHONE (OUTPATIENT)
Dept: NEUROLOGY | Facility: CLINIC | Age: 18
End: 2023-03-02
Payer: COMMERCIAL

## 2023-03-02 NOTE — TELEPHONE ENCOUNTER
----- Message from Chilo Varela sent at 3/2/2023  9:02 AM CST -----  Regarding: advice  Contact: AMBER ZAVALA [[id number]]  Type: Needs Medical Advice    Who Called:  Jammie Alexander High    Symptoms (please be specific):  na    How long has patient had these symptoms:  celio    Pharmacy name and phone #:  na    Best Call Back Number: [phone], [phone]    Additional Information: Needs to clarify a medication letter. Please call to advise

## 2023-03-13 ENCOUNTER — PATIENT MESSAGE (OUTPATIENT)
Dept: NEUROLOGY | Facility: CLINIC | Age: 18
End: 2023-03-13
Payer: COMMERCIAL

## 2023-05-12 DIAGNOSIS — G44.86 CERVICOGENIC HEADACHE: ICD-10-CM

## 2023-05-12 RX ORDER — GABAPENTIN 100 MG/1
CAPSULE ORAL
Qty: 60 CAPSULE | Refills: 6 | Status: SHIPPED | OUTPATIENT
Start: 2023-05-12

## 2023-09-05 RX ORDER — RIZATRIPTAN BENZOATE 10 MG/1
TABLET, ORALLY DISINTEGRATING ORAL
Qty: 12 TABLET | Refills: 4 | Status: SHIPPED | OUTPATIENT
Start: 2023-09-05

## 2023-11-10 NOTE — TELEPHONE ENCOUNTER
Called patient to confirm appointment but no answer. Left voicemail.   Called patient's daughter to review thyroid labs.    Discussed with daughter that prescription for levothyroxine will be sent to pharmacy. Instructed that medication should be taken on an empty stomach. Daughter verbalized understanding and will communicate with patient.    Ana Cristina MORENON, RN

## 2024-04-16 ENCOUNTER — OFFICE VISIT (OUTPATIENT)
Dept: OBSTETRICS AND GYNECOLOGY | Facility: CLINIC | Age: 19
End: 2024-04-16
Attending: OBSTETRICS & GYNECOLOGY
Payer: COMMERCIAL

## 2024-04-16 VITALS
WEIGHT: 173.06 LBS | HEIGHT: 62 IN | BODY MASS INDEX: 31.85 KG/M2 | SYSTOLIC BLOOD PRESSURE: 110 MMHG | DIASTOLIC BLOOD PRESSURE: 84 MMHG

## 2024-04-16 DIAGNOSIS — Z01.419 ENCOUNTER FOR GYNECOLOGICAL EXAMINATION: Primary | ICD-10-CM

## 2024-04-16 PROCEDURE — 99999 PR PBB SHADOW E&M-EST. PATIENT-LVL III: CPT | Mod: PBBFAC,,, | Performed by: OBSTETRICS & GYNECOLOGY

## 2024-04-16 PROCEDURE — 99385 PREV VISIT NEW AGE 18-39: CPT | Mod: S$PBB,,, | Performed by: OBSTETRICS & GYNECOLOGY

## 2024-04-16 PROCEDURE — 99213 OFFICE O/P EST LOW 20 MIN: CPT | Mod: PBBFAC | Performed by: OBSTETRICS & GYNECOLOGY

## 2024-04-16 RX ORDER — EPINEPHRINE 0.3 MG/.3ML
INJECTION SUBCUTANEOUS
COMMUNITY
Start: 2023-12-22

## 2024-04-16 RX ORDER — LEVONORGESTREL AND ETHINYL ESTRADIOL 0.15-0.03
1 KIT ORAL DAILY
Qty: 84 TABLET | Refills: 3 | Status: SHIPPED | OUTPATIENT
Start: 2024-04-16 | End: 2025-04-16

## 2024-04-16 NOTE — PROGRESS NOTES
"Subjective:       Patient ID: Dorys Rich is a 18 y.o. female.    Chief Complaint:  Annual Exam (No pap on record; c/o heavy and painful periods. )      History of Present Illness  - here for annual. Patient has very heavy, painful periods lasting 7 days and recurring monthly. She denies intermenstrual bleeding. Cramps are only sometimes helped with ibuprofen or Tylenol.  - has had weight gain in the past few months. Diagnosed with non-toxic goiter. Will see Endocrinology. No meds yet.  - not sexually active.    Past Medical History:   Diagnosis Date    Anxiety     Attention deficit     Autism disorder     expressive language and cognitive desorder , like a 8 yo;     Depression     Headache        Past Surgical History:   Procedure Laterality Date    ADENOIDECTOMY      CHOLECYSTECTOMY      LAPAROSCOPIC CHOLECYSTECTOMY N/A 12/27/2021    Procedure: CHOLECYSTECTOMY-LAPAROSCOPIC;  Surgeon: Eb Deleon MD;  Location[facility];  Service: General;  Laterality: N/A;    MYRINGOTOMY W/ TUBES      TONSILLECTOMY          Family History   Problem Relation Name Age of Onset    Depression Father      Anxiety disorder Father      Juvenile idiopathic arthritis Father      Polycystic ovary syndrome Mother      Depression Mother      Anxiety disorder Mother      Psoriasis Mother          psoriatic arthritis    Hypertension Brother      Depression Sister      Anxiety disorder Sister      Hypertension Sister      Migraines Sister      Ovarian cysts Sister      Psoriasis Son      Osteoarthritis Neg Hx      Inflammatory bowel disease Neg Hx          Social History     Socioeconomic History    Marital status: Single   Tobacco Use    Smoking status: Never    Smokeless tobacco: Never   Substance and Sexual Activity    Alcohol use: Not Currently    Drug use: Not Currently    Sexual activity: Not Currently           Objective:     Vitals:    04/16/24 0832   BP: 110/84   Weight: 78.5 kg (173 lb 1 oz)   Height: 5' 2" (1.575 m) " "      Physical Exam:   Constitutional: She appears well-developed and well-nourished. She is cooperative. No distress.                           Neurological: She is alert.       Deferred pelvic exam.     Assessment/ Plan:          Dorys Bello" was seen today for annual exam.    Diagnoses and all orders for this visit:    Encounter for gynecological examination    - discussed risks/benefits of cycle control with combo ocps. Start on Sunday and take at the same time daily. May take three months to see how they help her period.  - patient will keep me posted re: thyroid issues.  - patient verbalized understanding and agrees with plan.      Follow up in about 4 months (around 8/16/2024) for follow up.    As of April 1, 2021, the Cures Act has been passed nationally. This new law requires that all doctors progress notes, lab results, pathology reports and radiology reports be released IMMEDIATELY to the patient in the patient portal. That means that the results are released to you at the EXACT same time they are released to me. Therefore, with all of the patients that I have I am not able to reply to each patient exactly when the results come in. So there will be a delay from when you see the results to when I see them and have time to come up with a response to send you. Also I only see these results when I am on the computer at work. So if the results come in over the weekend or after 5 pm of a work day, I will not see them until the next business day. As you can tell, this is a challenge as a physician to give every patient the quick response they hope for and deserve. So please be patient!   Thanks for your understanding and patience.    "